# Patient Record
Sex: MALE | Race: WHITE
[De-identification: names, ages, dates, MRNs, and addresses within clinical notes are randomized per-mention and may not be internally consistent; named-entity substitution may affect disease eponyms.]

---

## 2018-10-05 ENCOUNTER — HOSPITAL ENCOUNTER (OUTPATIENT)
Dept: HOSPITAL 61 - SURG | Age: 53
Discharge: HOME | End: 2018-10-05
Attending: INTERNAL MEDICINE
Payer: COMMERCIAL

## 2018-10-05 VITALS — DIASTOLIC BLOOD PRESSURE: 86 MMHG | SYSTOLIC BLOOD PRESSURE: 132 MMHG

## 2018-10-05 DIAGNOSIS — E78.00: ICD-10-CM

## 2018-10-05 DIAGNOSIS — Z79.899: ICD-10-CM

## 2018-10-05 DIAGNOSIS — F17.200: ICD-10-CM

## 2018-10-05 DIAGNOSIS — I10: ICD-10-CM

## 2018-10-05 DIAGNOSIS — E11.9: ICD-10-CM

## 2018-10-05 DIAGNOSIS — K64.0: ICD-10-CM

## 2018-10-05 DIAGNOSIS — Z79.4: ICD-10-CM

## 2018-10-05 DIAGNOSIS — F17.210: ICD-10-CM

## 2018-10-05 DIAGNOSIS — K62.1: ICD-10-CM

## 2018-10-05 DIAGNOSIS — D12.3: Primary | ICD-10-CM

## 2018-10-05 PROCEDURE — 45380 COLONOSCOPY AND BIOPSY: CPT

## 2018-10-05 PROCEDURE — 82962 GLUCOSE BLOOD TEST: CPT

## 2018-10-05 PROCEDURE — 88305 TISSUE EXAM BY PATHOLOGIST: CPT

## 2018-10-05 PROCEDURE — 45385 COLONOSCOPY W/LESION REMOVAL: CPT

## 2018-10-09 NOTE — PATHOLOGY
[facility] Accession Number: [id number]

.                                                                01

Material submitted:                                        .

PART A: HEPATIC FLEXURE POLYP

PART B: RECTUM POLYP

.                                                                01

Clinician provided ICD-10:

Z12.11

.                                                                01

Clinical history:                                          .

Screening

.                                                                02

**********************************************************************

Diagnosis:

A.  Colon biopsies, hepatic flexure polyp:

- Tubular adenoma.

.

B.  Colorectal biopsy, rectal polyp:

- Consistent with hyperplastic polyp.

.

(JPM:; 10/08/2018)

MBR/10/09/2018

**********************************************************************

.                                                                02

Comment:

There is no high grade dysplasia or evidence of malignancy.

.

(JPM:; 10/08/2018)

.                                                                02

Electronically signed:                                     .

Robby Harley MD, Pathologist

NPI- [id number]

.                                                                01

Gross description:                                         .

A.  Received in formalin labeled "Rajat Wiggins Jr, hepatic flexure

polyp," are 2 segments of tan soft tissue measuring 0.9 x 0.3 x 0.3 cm in

aggregate dimensions and ranging from 0.4 to 0.5 cm in maximum dimension.

The specimen is submitted entirely in cassette A1.

.

B.  Received in formalin labeled "Rajat Wiggins Jr, rectum polyp," is a

single segment of tan soft tissue measuring 0.3 cm in maximum dimension.

The specimen is entirely submitted in cassette B1.

(TSD; 10/5/2018)

TOB/TOB

.                                                                02

Pathologist provided ICD-10:

D12.3, K63.5, K62.1

.                                                                02

CPT                                                        .

[id number], [id number]

Specimen Comment: A courtesy copy of this report has been sent to

Specimen Comment: [phone], [phone].

Specimen Comment: Report sent to  / DR RAMOS

Specimen Comment: A duplicate report has been generated due to demographic updates.

***Performed at:  01

   [facility]

   [address], [place]  [id number]

   MD Anuj Salomon MD Phone:  [phone]

***Performed at:  02

   [facility]

   [address], [place]  [id number]

   MD Robby Harley MD Phone:  [phone]